# Patient Record
Sex: MALE | Race: WHITE | Employment: FULL TIME | URBAN - METROPOLITAN AREA
[De-identification: names, ages, dates, MRNs, and addresses within clinical notes are randomized per-mention and may not be internally consistent; named-entity substitution may affect disease eponyms.]

---

## 2024-10-04 ENCOUNTER — APPOINTMENT (OUTPATIENT)
Dept: CT IMAGING | Facility: HOSPITAL | Age: 59
End: 2024-10-04
Attending: EMERGENCY MEDICINE
Payer: COMMERCIAL

## 2024-10-04 ENCOUNTER — HOSPITAL ENCOUNTER (OUTPATIENT)
Age: 59
Discharge: ANOTHER HOSPITAL | End: 2024-10-04
Payer: COMMERCIAL

## 2024-10-04 ENCOUNTER — HOSPITAL ENCOUNTER (EMERGENCY)
Facility: HOSPITAL | Age: 59
Discharge: HOME | End: 2024-10-04
Attending: EMERGENCY MEDICINE
Payer: COMMERCIAL

## 2024-10-04 VITALS
DIASTOLIC BLOOD PRESSURE: 76 MMHG | SYSTOLIC BLOOD PRESSURE: 135 MMHG | TEMPERATURE: 97 F | RESPIRATION RATE: 18 BRPM | OXYGEN SATURATION: 99 % | HEART RATE: 86 BPM

## 2024-10-04 VITALS
HEIGHT: 72 IN | HEART RATE: 67 BPM | TEMPERATURE: 98 F | SYSTOLIC BLOOD PRESSURE: 103 MMHG | RESPIRATION RATE: 16 BRPM | OXYGEN SATURATION: 98 % | WEIGHT: 215 LBS | DIASTOLIC BLOOD PRESSURE: 78 MMHG | BODY MASS INDEX: 29.12 KG/M2

## 2024-10-04 DIAGNOSIS — A08.4 VIRAL ENTERITIS: ICD-10-CM

## 2024-10-04 DIAGNOSIS — R11.2 NAUSEA AND VOMITING IN ADULT: ICD-10-CM

## 2024-10-04 DIAGNOSIS — K52.9 ACUTE COLITIS: Primary | ICD-10-CM

## 2024-10-04 DIAGNOSIS — R10.84 ABDOMINAL PAIN, GENERALIZED: Primary | ICD-10-CM

## 2024-10-04 LAB
ALBUMIN SERPL-MCNC: 4.5 G/DL (ref 3.2–4.8)
ALBUMIN/GLOB SERPL: 1.7 {RATIO} (ref 1–2)
ALP LIVER SERPL-CCNC: 66 U/L
ALT SERPL-CCNC: 21 U/L
ANION GAP SERPL CALC-SCNC: 6 MMOL/L (ref 0–18)
AST SERPL-CCNC: 20 U/L (ref ?–34)
BASOPHILS # BLD AUTO: 0.04 X10(3) UL (ref 0–0.2)
BASOPHILS NFR BLD AUTO: 0.5 %
BILIRUB SERPL-MCNC: 1.3 MG/DL (ref 0.2–1.1)
BILIRUB UR QL: NEGATIVE
BUN BLD-MCNC: 15 MG/DL (ref 9–23)
BUN/CREAT SERPL: 14.6 (ref 10–20)
CALCIUM BLD-MCNC: 9.5 MG/DL (ref 8.7–10.4)
CHLORIDE SERPL-SCNC: 107 MMOL/L (ref 98–112)
CLARITY UR: CLEAR
CO2 SERPL-SCNC: 29 MMOL/L (ref 21–32)
COLOR UR: YELLOW
CREAT BLD-MCNC: 1.03 MG/DL
DEPRECATED RDW RBC AUTO: 40.6 FL (ref 35.1–46.3)
EGFRCR SERPLBLD CKD-EPI 2021: 83 ML/MIN/1.73M2 (ref 60–?)
EOSINOPHIL # BLD AUTO: 0.02 X10(3) UL (ref 0–0.7)
EOSINOPHIL NFR BLD AUTO: 0.2 %
ERYTHROCYTE [DISTWIDTH] IN BLOOD BY AUTOMATED COUNT: 11.8 % (ref 11–15)
GLOBULIN PLAS-MCNC: 2.6 G/DL (ref 2–3.5)
GLUCOSE BLD-MCNC: 104 MG/DL (ref 70–99)
GLUCOSE UR-MCNC: NORMAL MG/DL
HCT VFR BLD AUTO: 45.2 %
HGB BLD-MCNC: 16.3 G/DL
HGB UR QL STRIP.AUTO: NEGATIVE
IMM GRANULOCYTES # BLD AUTO: 0.03 X10(3) UL (ref 0–1)
IMM GRANULOCYTES NFR BLD: 0.4 %
KETONES UR-MCNC: 60 MG/DL
LACTATE SERPL-SCNC: 1 MMOL/L (ref 0.5–2)
LEUKOCYTE ESTERASE UR QL STRIP.AUTO: NEGATIVE
LIPASE SERPL-CCNC: 30 U/L (ref 12–53)
LYMPHOCYTES # BLD AUTO: 1.65 X10(3) UL (ref 1–4)
LYMPHOCYTES NFR BLD AUTO: 19.8 %
MCH RBC QN AUTO: 33.7 PG (ref 26–34)
MCHC RBC AUTO-ENTMCNC: 36.1 G/DL (ref 31–37)
MCV RBC AUTO: 93.4 FL
MONOCYTES # BLD AUTO: 0.66 X10(3) UL (ref 0.1–1)
MONOCYTES NFR BLD AUTO: 7.9 %
NEUTROPHILS # BLD AUTO: 5.95 X10 (3) UL (ref 1.5–7.7)
NEUTROPHILS # BLD AUTO: 5.95 X10(3) UL (ref 1.5–7.7)
NEUTROPHILS NFR BLD AUTO: 71.2 %
NITRITE UR QL STRIP.AUTO: NEGATIVE
OSMOLALITY SERPL CALC.SUM OF ELEC: 295 MOSM/KG (ref 275–295)
PH UR: 6 [PH] (ref 5–8)
PLATELET # BLD AUTO: 155 10(3)UL (ref 150–450)
POTASSIUM SERPL-SCNC: 4.5 MMOL/L (ref 3.5–5.1)
PROT SERPL-MCNC: 7.1 G/DL (ref 5.7–8.2)
PROT UR-MCNC: 20 MG/DL
RBC # BLD AUTO: 4.84 X10(6)UL
SODIUM SERPL-SCNC: 142 MMOL/L (ref 136–145)
SP GR UR STRIP: 1.03 (ref 1–1.03)
UROBILINOGEN UR STRIP-ACNC: 2
WBC # BLD AUTO: 8.4 X10(3) UL (ref 4–11)

## 2024-10-04 PROCEDURE — 74177 CT ABD & PELVIS W/CONTRAST: CPT | Performed by: EMERGENCY MEDICINE

## 2024-10-04 PROCEDURE — 81001 URINALYSIS AUTO W/SCOPE: CPT | Performed by: EMERGENCY MEDICINE

## 2024-10-04 PROCEDURE — 99285 EMERGENCY DEPT VISIT HI MDM: CPT

## 2024-10-04 PROCEDURE — 96375 TX/PRO/DX INJ NEW DRUG ADDON: CPT

## 2024-10-04 PROCEDURE — 83605 ASSAY OF LACTIC ACID: CPT | Performed by: EMERGENCY MEDICINE

## 2024-10-04 PROCEDURE — S0028 INJECTION, FAMOTIDINE, 20 MG: HCPCS | Performed by: EMERGENCY MEDICINE

## 2024-10-04 PROCEDURE — 83690 ASSAY OF LIPASE: CPT | Performed by: EMERGENCY MEDICINE

## 2024-10-04 PROCEDURE — 99215 OFFICE O/P EST HI 40 MIN: CPT | Performed by: NURSE PRACTITIONER

## 2024-10-04 PROCEDURE — 85025 COMPLETE CBC W/AUTO DIFF WBC: CPT | Performed by: EMERGENCY MEDICINE

## 2024-10-04 PROCEDURE — 96361 HYDRATE IV INFUSION ADD-ON: CPT

## 2024-10-04 PROCEDURE — 99284 EMERGENCY DEPT VISIT MOD MDM: CPT

## 2024-10-04 PROCEDURE — 96374 THER/PROPH/DIAG INJ IV PUSH: CPT

## 2024-10-04 PROCEDURE — 80053 COMPREHEN METABOLIC PANEL: CPT | Performed by: EMERGENCY MEDICINE

## 2024-10-04 RX ORDER — DICYCLOMINE HCL 20 MG
20 TABLET ORAL 4 TIMES DAILY PRN
Qty: 30 TABLET | Refills: 0 | Status: SHIPPED | OUTPATIENT
Start: 2024-10-04 | End: 2024-11-03

## 2024-10-04 RX ORDER — KETOROLAC TROMETHAMINE 15 MG/ML
15 INJECTION, SOLUTION INTRAMUSCULAR; INTRAVENOUS ONCE
Status: COMPLETED | OUTPATIENT
Start: 2024-10-04 | End: 2024-10-04

## 2024-10-04 RX ORDER — FAMOTIDINE 10 MG/ML
20 INJECTION, SOLUTION INTRAVENOUS ONCE
Status: COMPLETED | OUTPATIENT
Start: 2024-10-04 | End: 2024-10-04

## 2024-10-04 RX ORDER — ROSUVASTATIN CALCIUM 20 MG/1
20 TABLET, COATED ORAL DAILY
COMMUNITY

## 2024-10-04 RX ORDER — METRONIDAZOLE 500 MG/1
500 TABLET ORAL 3 TIMES DAILY
Qty: 30 TABLET | Refills: 0 | Status: SHIPPED | OUTPATIENT
Start: 2024-10-04 | End: 2024-10-14

## 2024-10-04 RX ORDER — LISINOPRIL 5 MG/1
5 TABLET ORAL DAILY
COMMUNITY

## 2024-10-04 RX ORDER — CEFUROXIME AXETIL 500 MG/1
500 TABLET ORAL 2 TIMES DAILY
Qty: 20 TABLET | Refills: 0 | Status: SHIPPED | OUTPATIENT
Start: 2024-10-04 | End: 2024-10-14

## 2024-10-04 RX ORDER — ASPIRIN 81 MG/1
81 TABLET ORAL DAILY
COMMUNITY

## 2024-10-04 RX ORDER — ONDANSETRON 2 MG/ML
4 INJECTION INTRAMUSCULAR; INTRAVENOUS ONCE
Status: COMPLETED | OUTPATIENT
Start: 2024-10-04 | End: 2024-10-04

## 2024-10-04 NOTE — ED PROVIDER NOTES
Patient Seen in: [facility] Emergency Department    History     Chief Complaint   Patient presents with    Abdominal Pain       HPI    59-year-old male who presents ER today with abdominal that is been going on since this past Tuesday.  A lot of cramping pain.  Some nausea couple episodes of vomiting for the past few days.  Patient was seen in immediate care and sent here for evaluation and CT.    History reviewed.   Past Medical History:    Essential hypertension       History reviewed. History reviewed. No pertinent surgical history.      Medications :  (Not in a hospital admission)       No family history on file.    Smoking Status:   Social History     Socioeconomic History    Marital status:    Tobacco Use    Smoking status: Never    Smokeless tobacco: Never   Vaping Use    Vaping status: Never Used   Substance and Sexual Activity    Alcohol use: Yes    Drug use: Never       Constitutional and vital signs reviewed.      Social History and Family History elements reviewed from today, pertinent positives to the presenting problem noted.    Physical Exam     ED Triage Vitals [10/04/24 1028]   /82   Pulse 73   Resp 18   Temp 98.2 °F (36.8 °C)   Temp src Oral   SpO2 99 %   O2 Device None (Room air)       All measures to prevent infection transmission during my interaction with the patient were taken. Handwashing was performed prior to and after the exam.  Stethoscope and any equipment used during my examination was cleaned with super sani-cloth germicidal wipes following the exam.     Physical Exam  Vitals and nursing note reviewed.   Cardiovascular:      Rate and Rhythm: Normal rate.   Pulmonary:      Effort: Pulmonary effort is normal.   Abdominal:      Palpations: Abdomen is soft.      Tenderness: There is generalized abdominal tenderness.   Skin:     General: Skin is warm and dry.   Neurological:      General: No focal deficit present.      Mental Status: He is alert.         ED Course         Labs Reviewed   COMP METABOLIC PANEL (14) - Abnormal; Notable for the following components:       Result Value    Glucose 104 (*)     Bilirubin, Total 1.3 (*)     All other components within normal limits   URINALYSIS WITH CULTURE REFLEX - Abnormal; Notable for the following components:    Ketones Urine 60 (*)     Protein Urine 20 (*)     Urobilinogen Urine 2 (*)     All other components within normal limits   LIPASE - Normal   LACTIC ACID, PLASMA - Normal   CBC WITH DIFFERENTIAL WITH PLATELET   RAINBOW DRAW BLUE       As Interpreted by me    Imaging Results Available and Reviewed while in ED: CT ABDOMEN+PELVIS(CONTRAST ONLY)(CPT=74177)    Result Date: 10/4/2024  CONCLUSION:   Focal enteritis involving a few small bowel loops within the mid abdomen.  Infectious versus inflammatory etiologies are felt to be most likely.  Ischemic etiology is felt less likely.  Correlation with lactate suggested if clinically indicated.  Partial small bowel obstruction/low-grade ileus within the left upper quadrant.  No transition point.  Focal area of wall thickening involving the ascending colon may be secondary to a mild focal colitis.  If not recently performed, after resolution of patient's symptoms, nonemergent colonoscopy recommended to exclude underlying colonic mass.  Normal appendix.  Multiple other incidental findings as described in the body of the report.       Dictated by (CST): Yong Ahumada MD on 10/04/2024 at 12:27 PM     Finalized by (CST): Yong Ahumada MD on 10/04/2024 at 12:35 PM         ED Medications Administered:   Medications   ondansetron (Zofran) 4 MG/2ML injection 4 mg (4 mg Intravenous Given 10/4/24 1056)   sodium chloride 0.9 % IV bolus 1,000 mL (0 mL Intravenous Stopped 10/4/24 1156)   famotidine (Pepcid) 20 mg/2mL injection 20 mg (20 mg Intravenous Given 10/4/24 1056)   ketorolac (Toradol) 15 MG/ML injection 15 mg (15 mg Intravenous Given 10/4/24 1056)   iopamidol 76% (ISOVUE-370) injection for power  injector (80 mL Intravenous Given 10/4/24 1208)         MDM     Vitals:    10/04/24 1130 10/04/24 1145 10/04/24 1400 10/04/24 1415   BP: 116/67 121/79  103/78   Pulse: 63 63 65 67   Resp:       Temp:       TempSrc:       SpO2: 97% 96% 97% 98%   Weight:       Height:         *I personally reviewed and interpreted all ED vitals.    Pulse Ox: 97%, Room air, Normal     Monitor Interpretation:   normal sinus rhythm as interpreted by me.  The cardiac monitor was ordered to monitor cardiac rate and rhythm.    Differential Diagnosis/ Diagnostic Considerations: Gastroenteritis, colitis, cholecystitis, appendicitis    Complicating Factors: The patient already has does not have a problem list on file. to contribute to the complexity of this ED evaluation.    Medical Decision Making  Amount and/or Complexity of Data Reviewed  External Data Reviewed:      Details: Reviewed immediate care evaluation from today to see with her evaluations and recommendations  Labs: ordered. Decision-making details documented in ED Course.  Radiology: ordered and independent interpretation performed. Decision-making details documented in ED Course.  ECG/medicine tests: ordered and independent interpretation performed. Decision-making details documented in ED Course.    Risk  OTC drugs.  Prescription drug management.      Patient feels better after meds.  Reviewed all results with patient.  Labs not show anything acute lactate negative.  CT shows enteritis with early colitis.  Will treat with 2 antibiotics and Bentyl as needed for abdominal cramping.  Also take Tylenol as needed for pain over-the-counter.  Patient does understand he needs to follow-up with his primary care provider for further evaluation and reevaluation in 1 to 2 days.  Follow-up with GI as soon as possible call to schedule appointment.  Return to the ER if symptoms continue, get worse, unable to follow-up  Condition upon leaving the department: Stable    Disposition and Plan      Clinical Impression:  1. Acute colitis    2. Viral enteritis        Disposition:  Discharge    Follow-up:  Zeferino Garg [facility]  [facility] [id number] [phone]    Follow up      Mónica Allen MD  [address] [id number] [phone]    Follow up        Medications Prescribed:  Current Discharge Medication List        START taking these medications    Details   cefuroxime 500 MG Oral Tab Take 1 tablet (500 mg total) by mouth 2 (two) times daily for 10 days.  Qty: 20 tablet, Refills: 0      metRONIDAZOLE 500 MG Oral Tab Take 1 tablet (500 mg total) by mouth 3 (three) times daily for 10 days.  Qty: 30 tablet, Refills: 0      dicyclomine 20 MG Oral Tab Take 1 tablet (20 mg total) by mouth 4 (four) times daily as needed.  Qty: 30 tablet, Refills: 0

## 2024-10-04 NOTE — ED QUICK NOTES
Pt ambulatory to ED tx room 30 from triage.  Pt A&O x 4, answering all ?s appropriately.   Pt connected to cont pulse ox & BP.   Pt here w/ c/o: Abdominal pain since Tuesday.  Pt also c/o cramping and bloating.  Pt reporting pain intermittent.  Pt was sent from IC for further eval.  (+) n/v.  Pt denies constipation, dysuria.   Cart in low/locked position, side rails up x 2, call light w/ in reach.

## 2024-10-04 NOTE — ED PROVIDER NOTES
Patient Seen in: [facility]      History     Chief Complaint   Patient presents with    Abdominal Pain     Stated Complaint: STOMACH PAIN    Subjective:   HPI  ED History source : patient    This is a 59-year-old male with history of hypertension hypercholesteremia sleep apnea presenting with abdominal pain and vomiting.  Patient states on Tuesday after eating food he developed upper abdominal pain that goes into his left lower stomach area.  Patient states that when he tries to eat he gets nausea and vomiting.  Has not really been able to keep any solid food down.  Denies any back pain or urinary symptoms or fever chest pain or shortness of breath.  Denies any abdominal surgeries.  Patient states he does occasionally drink wine denies any tobacco or drug use.      Objective:     History reviewed. No pertinent past medical history.           History reviewed. No pertinent surgical history.             No pertinent social history.            Review of Systems    Positive for stated complaint: STOMACH PAIN  Other systems are as noted in HPI.  Constitutional and vital signs reviewed.      All other systems reviewed and negative except as noted above.    Physical Exam     ED Triage Vitals [10/04/24 0938]   /76   Pulse 86   Resp 18   Temp 97 °F (36.1 °C)   Temp src Temporal   SpO2 99 %   O2 Device None (Room air)       Current Vitals:   Vital Signs  BP: 135/76  Pulse: 86  Resp: 18  Temp: 97 °F (36.1 °C)  Temp src: Temporal    Oxygen Therapy  SpO2: 99 %  O2 Device: None (Room air)        Physical Exam  Vitals and nursing note reviewed.   Constitutional:       Appearance: Normal appearance.   HENT:      Right Ear: External ear normal.      Left Ear: External ear normal.      Nose: Nose normal.      Mouth/Throat:      Mouth: Mucous membranes are moist.      Pharynx: Oropharynx is clear.   Eyes:      Conjunctiva/sclera: Conjunctivae normal.   Cardiovascular:      Rate and Rhythm: Normal rate.    Pulmonary:      Effort: Pulmonary effort is normal.      Breath sounds: Normal breath sounds.   Abdominal:      Palpations: Abdomen is soft.      Tenderness: There is generalized abdominal tenderness. There is no right CVA tenderness or left CVA tenderness.   Musculoskeletal:         General: Normal range of motion.      Cervical back: Normal range of motion.   Skin:     General: Skin is warm.      Capillary Refill: Capillary refill takes less than 2 seconds.   Neurological:      General: No focal deficit present.      Mental Status: He is alert and oriented to person, place, and time.           ED Course   Labs Reviewed - No data to display            MDM       Medical Decision Making  59-year-old male nontoxic-appearing presenting with abdominal pain nausea and vomiting.  DDx cholelithiasis versus cholecystitis versus gastritis versus GERD versus diverticulitis versus appendicitis versus pancreatitis.  Patient has generalized tenderness specifically in the upper abdomen.  Possible diagnosis with the patient and limitations here at the [facility] no advanced imaging and unable to check CMP and lipase.  Discussed transfer to the ER and risk factors of not going such as worsening symptoms debilitating disease or death.  Patient states he understands the risk factors and will go to [facility] emergency department.    Amount and/or Complexity of Data Reviewed  Discussion of management or test interpretation with external provider(s): This patient was discussed with my attending Dr. Meraz who agrees with this provider's management and plan of care.        Disposition and Plan     Clinical Impression:  1. Abdominal pain, generalized    2. Nausea and vomiting in adult         Disposition:  Ic to ed  10/4/2024  9:57 am    Follow-up:  No follow-up provider specified.        Medications Prescribed:  Discharge Medication List as of 10/4/2024 10:03 AM              Supplementary Documentation:

## 2024-10-04 NOTE — ED QUICK NOTES
Patient safe to discharge home per ED Provider. Discharge education provided, including follow up instructions. IV removed by this RN. Patient verbalizes understanding. Family at bedside.

## 2024-10-04 NOTE — DISCHARGE INSTRUCTIONS
Take medications as prescribed.  Follow-up with your primary care provider in 1 to 2 days.  Follow-up with GI as soon as possible call to schedule appointment.  Return to the ER if some continue, get worse, unable to follow-up

## 2024-10-04 NOTE — ED INITIAL ASSESSMENT (HPI)
Patient c/o upper abdominal pain that radiates to LLQ x 2 days, also states he has been vomiting as well, usually an hour after eating solid food. Denies fever.